# Patient Record
Sex: MALE | Race: WHITE | NOT HISPANIC OR LATINO | Employment: UNEMPLOYED | URBAN - METROPOLITAN AREA
[De-identification: names, ages, dates, MRNs, and addresses within clinical notes are randomized per-mention and may not be internally consistent; named-entity substitution may affect disease eponyms.]

---

## 2023-10-25 ENCOUNTER — APPOINTMENT (OUTPATIENT)
Dept: RADIOLOGY | Facility: HOSPITAL | Age: 33
End: 2023-10-25
Payer: COMMERCIAL

## 2023-10-25 ENCOUNTER — HOSPITAL ENCOUNTER (EMERGENCY)
Facility: HOSPITAL | Age: 33
Discharge: HOME | End: 2023-10-25
Attending: STUDENT IN AN ORGANIZED HEALTH CARE EDUCATION/TRAINING PROGRAM
Payer: COMMERCIAL

## 2023-10-25 VITALS
HEART RATE: 82 BPM | RESPIRATION RATE: 16 BRPM | BODY MASS INDEX: 15.31 KG/M2 | TEMPERATURE: 98.2 F | SYSTOLIC BLOOD PRESSURE: 120 MMHG | DIASTOLIC BLOOD PRESSURE: 76 MMHG | WEIGHT: 78 LBS | OXYGEN SATURATION: 97 % | HEIGHT: 60 IN

## 2023-10-25 DIAGNOSIS — K59.00 CONSTIPATION, UNSPECIFIED CONSTIPATION TYPE: Primary | ICD-10-CM

## 2023-10-25 LAB
ANION GAP SERPL CALC-SCNC: 11 MMOL/L (ref 10–20)
APPEARANCE UR: CLEAR
BASOPHILS # BLD AUTO: 0.03 X10*3/UL (ref 0–0.1)
BASOPHILS NFR BLD AUTO: 0.5 %
BILIRUB UR STRIP.AUTO-MCNC: NEGATIVE MG/DL
BUN SERPL-MCNC: 16 MG/DL (ref 6–23)
CALCIUM SERPL-MCNC: 9.2 MG/DL (ref 8.6–10.3)
CHLORIDE SERPL-SCNC: 105 MMOL/L (ref 98–107)
CO2 SERPL-SCNC: 28 MMOL/L (ref 21–32)
COLOR UR: YELLOW
CREAT SERPL-MCNC: 0.54 MG/DL (ref 0.5–1.3)
EOSINOPHIL # BLD AUTO: 0.07 X10*3/UL (ref 0–0.7)
EOSINOPHIL NFR BLD AUTO: 1.3 %
ERYTHROCYTE [DISTWIDTH] IN BLOOD BY AUTOMATED COUNT: 13.9 % (ref 11.5–14.5)
GFR SERPL CREATININE-BSD FRML MDRD: >90 ML/MIN/1.73M*2
GLUCOSE SERPL-MCNC: 85 MG/DL (ref 74–99)
GLUCOSE UR STRIP.AUTO-MCNC: NEGATIVE MG/DL
HCT VFR BLD AUTO: 42 % (ref 41–52)
HGB BLD-MCNC: 14.1 G/DL (ref 13.5–17.5)
HOLD SPECIMEN: NORMAL
IMM GRANULOCYTES # BLD AUTO: 0.01 X10*3/UL (ref 0–0.7)
IMM GRANULOCYTES NFR BLD AUTO: 0.2 % (ref 0–0.9)
KETONES UR STRIP.AUTO-MCNC: NEGATIVE MG/DL
LACTATE SERPL-SCNC: 0.8 MMOL/L (ref 0.4–2)
LEUKOCYTE ESTERASE UR QL STRIP.AUTO: NEGATIVE
LYMPHOCYTES # BLD AUTO: 1.25 X10*3/UL (ref 1.2–4.8)
LYMPHOCYTES NFR BLD AUTO: 22.4 %
MCH RBC QN AUTO: 33.1 PG (ref 26–34)
MCHC RBC AUTO-ENTMCNC: 33.6 G/DL (ref 32–36)
MCV RBC AUTO: 99 FL (ref 80–100)
MONOCYTES # BLD AUTO: 0.43 X10*3/UL (ref 0.1–1)
MONOCYTES NFR BLD AUTO: 7.7 %
NEUTROPHILS # BLD AUTO: 3.79 X10*3/UL (ref 1.2–7.7)
NEUTROPHILS NFR BLD AUTO: 67.9 %
NITRITE UR QL STRIP.AUTO: NEGATIVE
NRBC BLD-RTO: 0 /100 WBCS (ref 0–0)
PH UR STRIP.AUTO: 7 [PH]
PLATELET # BLD AUTO: 249 X10*3/UL (ref 150–450)
PMV BLD AUTO: 11.6 FL (ref 7.5–11.5)
POTASSIUM SERPL-SCNC: 4.2 MMOL/L (ref 3.5–5.3)
PROT UR STRIP.AUTO-MCNC: NEGATIVE MG/DL
RBC # BLD AUTO: 4.26 X10*6/UL (ref 4.5–5.9)
RBC # UR STRIP.AUTO: NEGATIVE /UL
SODIUM SERPL-SCNC: 140 MMOL/L (ref 136–145)
SP GR UR STRIP.AUTO: 1.02
UROBILINOGEN UR STRIP.AUTO-MCNC: 2 MG/DL
WBC # BLD AUTO: 5.6 X10*3/UL (ref 4.4–11.3)

## 2023-10-25 PROCEDURE — 99284 EMERGENCY DEPT VISIT MOD MDM: CPT | Mod: 25 | Performed by: STUDENT IN AN ORGANIZED HEALTH CARE EDUCATION/TRAINING PROGRAM

## 2023-10-25 PROCEDURE — 36415 COLL VENOUS BLD VENIPUNCTURE: CPT | Performed by: NURSE PRACTITIONER

## 2023-10-25 PROCEDURE — 80048 BASIC METABOLIC PNL TOTAL CA: CPT | Performed by: NURSE PRACTITIONER

## 2023-10-25 PROCEDURE — 81003 URINALYSIS AUTO W/O SCOPE: CPT | Performed by: NURSE PRACTITIONER

## 2023-10-25 PROCEDURE — 74176 CT ABD & PELVIS W/O CONTRAST: CPT | Performed by: RADIOLOGY

## 2023-10-25 PROCEDURE — 85025 COMPLETE CBC W/AUTO DIFF WBC: CPT | Performed by: NURSE PRACTITIONER

## 2023-10-25 PROCEDURE — 74176 CT ABD & PELVIS W/O CONTRAST: CPT | Mod: ME

## 2023-10-25 PROCEDURE — 83605 ASSAY OF LACTIC ACID: CPT | Performed by: NURSE PRACTITIONER

## 2023-10-25 ASSESSMENT — PAIN - FUNCTIONAL ASSESSMENT: PAIN_FUNCTIONAL_ASSESSMENT: UNABLE TO SELF-REPORT

## 2023-10-25 NOTE — ED PROVIDER NOTES
"Chief Complaint   Patient presents with   • Abdominal Pain       HPI       32 year old male presents to the Emergency Department today complaining of a 2-3 day history of diffuse abdominal pain. Patient is nonverbal and all of the information is received from his mother. She noted that he has been \"grabbing\" at his abdomen and groin regions. Has chronic unchanged nausea and vomiting. Denies any associated fever, chills, headache, neck pain, chest pain, shortness of breath, diarrhea, constipation, hematemesis, hematochezia, melena, or urinary symptoms.       History provided by:  Patient             Patient History   No past medical history on file.  No past surgical history on file.  No family history on file.  Social History     Tobacco Use   • Smoking status: Never   • Smokeless tobacco: Never   Substance Use Topics   • Alcohol use: Never   • Drug use: Never           Physical Exam  Constitutional:       Appearance: Normal appearance.   HENT:      Head: Normocephalic.      Nose: Nose normal.      Mouth/Throat:      Mouth: Mucous membranes are moist.      Pharynx: Oropharynx is clear. No oropharyngeal exudate or posterior oropharyngeal erythema.   Eyes:      Conjunctiva/sclera: Conjunctivae normal.      Pupils: Pupils are equal, round, and reactive to light.   Cardiovascular:      Rate and Rhythm: Normal rate and regular rhythm.      Pulses:           Radial pulses are 3+ on the right side and 3+ on the left side.        Dorsalis pedis pulses are 3+ on the right side and 3+ on the left side.      Heart sounds: Normal heart sounds. No murmur heard.     No friction rub. No gallop.   Pulmonary:      Effort: Pulmonary effort is normal. No respiratory distress.      Breath sounds: Normal breath sounds. No wheezing, rhonchi or rales.   Abdominal:      General: Abdomen is flat. Bowel sounds are normal.      Palpations: Abdomen is soft.      Tenderness: There is no abdominal tenderness. There is no right CVA tenderness, " left CVA tenderness, guarding or rebound. Negative signs include Queen's sign and McBurney's sign.      Comments: G-tube present.    Musculoskeletal:         General: No swelling or deformity.      Cervical back: Full passive range of motion without pain.      Right lower leg: No edema.      Left lower leg: No edema.   Lymphadenopathy:      Cervical: No cervical adenopathy.   Skin:     Capillary Refill: Capillary refill takes less than 2 seconds.      Coloration: Skin is not jaundiced.      Findings: No rash.   Neurological:      General: No focal deficit present.      Mental Status: He is alert. Mental status is at baseline.   Psychiatric:         Mood and Affect: Mood normal.         Behavior: Behavior is cooperative.         Labs Reviewed   CBC WITH AUTO DIFFERENTIAL - Abnormal       Result Value    WBC 5.6      nRBC 0.0      RBC 4.26 (*)     Hemoglobin 14.1      Hematocrit 42.0      MCV 99      MCH 33.1      MCHC 33.6      RDW 13.9      Platelets 249      MPV 11.6 (*)     Neutrophils % 67.9      Immature Granulocytes %, Automated 0.2      Lymphocytes % 22.4      Monocytes % 7.7      Eosinophils % 1.3      Basophils % 0.5      Neutrophils Absolute 3.79      Immature Granulocytes Absolute, Automated 0.01      Lymphocytes Absolute 1.25      Monocytes Absolute 0.43      Eosinophils Absolute 0.07      Basophils Absolute 0.03     URINALYSIS WITH REFLEX MICROSCOPIC AND CULTURE - Abnormal    Color, Urine Yellow      Appearance, Urine Clear      Specific Gravity, Urine 1.023      pH, Urine 7.0      Protein, Urine NEGATIVE      Glucose, Urine NEGATIVE      Blood, Urine NEGATIVE      Ketones, Urine NEGATIVE      Bilirubin, Urine NEGATIVE      Urobilinogen, Urine 2.0 (*)     Nitrite, Urine NEGATIVE      Leukocyte Esterase, Urine NEGATIVE     BASIC METABOLIC PANEL - Normal    Glucose 85      Sodium 140      Potassium 4.2      Chloride 105      Bicarbonate 28      Anion Gap 11      Urea Nitrogen 16      Creatinine 0.54       eGFR >90      Calcium 9.2     LACTATE - Normal    Lactate 0.8      Narrative:     Venipuncture immediately after or during the administration of Metamizole may lead to falsely low results. Testing should be performed immediately  prior to Metamizole dosing.   URINALYSIS WITH REFLEX MICROSCOPIC AND CULTURE    Narrative:     The following orders were created for panel order Urinalysis with Reflex Microscopic and Culture.  Procedure                               Abnormality         Status                     ---------                               -----------         ------                     Urinalysis with Reflex M...[[id number]]  Abnormal            Final result               Extra Urine Gray Tube[[id number]]                            In process                   Please view results for these tests on the individual orders.   EXTRA URINE GRAY TUBE       CT abdomen pelvis wo IV contrast   Final Result   1. No acute process identified within the abdomen or pelvis.   2. Minimal patchy infiltrates within the left lower lobe.   3. Findings of constipation.        MACRO:   None.        Signed by: Rigoberto Baron 10/25/2023 8:07 AM   Dictation workstation:   [id number]               ED Course & MDM   Diagnoses as of 10/25/23 0911   Constipation, unspecified constipation type           Medical Decision Making  Patient was seen and evaluated by Dr. Marcano. Saline lock was established with labs drawn and results as above. Blood counts, electrolytes, kidney function, and lactate are all unremarkable. Urinalysis shows no signs of infection. CT scan of his abdomen and pelvis show constipation. This is likely related to his recent change in tube feedings. Repeat abdominal evaluation reveals an abdomen soft, nondistended, and nontender to palpation. There was no rebound, rigidity, or guarding noted. There were no peritoneal signs noted. Continued to have multiple benign serial abdominal exams. At this time, we find no  underlying evidence of acute pancreatitis, cholecystitis, cholangitis, diverticulitis, or appendicitis. CT scan show minimal patchy infiltrates in the LLL. Mother noted that he just finished steroids and antibiotics for such. No further indication for antibiotics at this time. To resume his Miralax. Follow up with their doctor in 3 days. Return if worse in any way. Discharged in stable condition with computer instructions.    Diagnostic Impression:     1. Acute constipation               Your medication list      You have not been prescribed any medications.           Procedure  Procedures     Vernon Suarez, CARSON-CNP  10/25/23 0911     Non-Graft Cartilage Fenestration Text: The cartilage was fenestrated with a 1 or 2mm punch biopsy to help facilitate healing.

## 2025-01-26 ENCOUNTER — OFFICE VISIT (OUTPATIENT)
Dept: URGENT CARE | Age: 35
End: 2025-01-26
Payer: COMMERCIAL

## 2025-01-26 VITALS
SYSTOLIC BLOOD PRESSURE: 132 MMHG | TEMPERATURE: 98.6 F | OXYGEN SATURATION: 97 % | DIASTOLIC BLOOD PRESSURE: 88 MMHG | HEART RATE: 84 BPM

## 2025-01-26 DIAGNOSIS — R21 RASH: Primary | ICD-10-CM

## 2025-01-26 PROCEDURE — 1036F TOBACCO NON-USER: CPT

## 2025-01-26 PROCEDURE — 99203 OFFICE O/P NEW LOW 30 MIN: CPT

## 2025-01-26 PROCEDURE — 99070 SPECIAL SUPPLIES PHYS/QHP: CPT

## 2025-01-26 RX ORDER — PREDNISONE 10 MG/1
TABLET ORAL
Qty: 30 TABLET | Refills: 0 | Status: SHIPPED | OUTPATIENT
Start: 2025-01-26

## 2025-01-26 NOTE — PROGRESS NOTES
Subjective   History of Present Illness: Aquilino Louis is a 34 y.o. male. They present today with a chief complaint of rash on face and neck x 5 days. Pt's mother states that unknown cause of the rash. No changes in pt's household cleaning products. Pt was given Benadryl but didn't alleviate his symptoms. Rash doesn't seem to bother him.         Past Medical History  Allergies as of 01/26/2025 - Reviewed 01/26/2025   Allergen Reaction Noted    Vancomycin Other 10/25/2023       (Not in a hospital admission)       History reviewed. No pertinent past medical history.    History reviewed. No pertinent surgical history.     reports that he has never smoked. He has never used smokeless tobacco. He reports that he does not drink alcohol and does not use drugs.    Review of Systems  Review of Systems                               Objective    Vitals:    01/26/25 1309   BP: 132/88   Pulse: 84   Temp: 37 °C (98.6 °F)   SpO2: 97%     No LMP for male patient.    Physical Exam  Vitals reviewed.   HENT:      Head: Normocephalic and atraumatic.      Nose: Nose normal.      Mouth/Throat:      Mouth: Mucous membranes are moist.   Eyes:      Extraocular Movements: Extraocular movements intact.      Conjunctiva/sclera: Conjunctivae normal.   Cardiovascular:      Rate and Rhythm: Normal rate.   Pulmonary:      Effort: Pulmonary effort is normal.   Skin:     General: Skin is warm.      Findings: Rash present. Rash is papular. Rash is not crusting, pustular, scaling or vesicular.      Comments: On face and neck   Neurological:      Mental Status: He is alert.             Point of Care Test & Imaging Results from this visit  No results found for this visit on 01/26/25.   No results found.    Diagnostic study results (if any) were reviewed by Cindy Thompson PA-C.    Assessment/Plan   Allergies, medications, history, and pertinent labs/EKGs/Imaging reviewed by Cindy Thompson PA-C.     Medical Decision Making  - rash is likely contact  dermatitis. Will treat with prednisone 12 days taper.   - I discussed the plan of care with pt's mother.  -         Patient is educated about their diagnoses.     -          Discussed medications benefits and adverse effects.     -          Answered all patient’s questions.     -          Patient will call 911 or go to the nearest ED if worsen symptoms .     -          Patient is agreeable to the plan of care and is deemed stable upon discharge.     -          Follow up with your primary care provider in two days.      Orders and Diagnoses  Diagnoses and all orders for this visit:  Rash  -     predniSONE (Deltasone) 10 mg tablet; 4 tabs x 3 days, then 3 tabs x 3 days, then 2 tabs x 3 days, then 1 tab x 3days      Medical Admin Record      Patient disposition: Home    Electronically signed by Cindy Thompson PA-C  1:46 PM